# Patient Record
Sex: MALE | Race: WHITE
[De-identification: names, ages, dates, MRNs, and addresses within clinical notes are randomized per-mention and may not be internally consistent; named-entity substitution may affect disease eponyms.]

---

## 2018-12-21 ENCOUNTER — HOSPITAL ENCOUNTER (EMERGENCY)
Dept: HOSPITAL 80 - FED | Age: 53
Discharge: HOME | End: 2018-12-21
Payer: COMMERCIAL

## 2018-12-21 VITALS — SYSTOLIC BLOOD PRESSURE: 126 MMHG | DIASTOLIC BLOOD PRESSURE: 79 MMHG

## 2018-12-21 DIAGNOSIS — J32.0: Primary | ICD-10-CM

## 2018-12-21 DIAGNOSIS — R19.7: ICD-10-CM

## 2018-12-21 LAB — PLATELET # BLD: 228 10^3/UL (ref 150–400)

## 2018-12-21 NOTE — EDPHY
H & P


Stated Complaint: GI problems~3wks;?due to antibx to tx sinus inf;[facility] 

[facility] ?cdiff


Time Seen by Provider: 12/21/18 15:57


HPI/ROS: 





HPI





CHIEF COMPLAINT:  Right-sided sinus disease.  Diarrhea.





HISTORY OF PRESENT ILLNESS:  53-year-old male, otherwise healthy presents 

emergency room stating that he has had some increasing right-sided sinus pain.  

Patient states in early November he had a dental procedure wrist surgery done 

to the right-sided upper jaw line with a bone graft.  He states this was 

somewhat complicated by a perforation of his right maxillary sinus.  He was 

placed on amoxicillin antibiotics for that.  This caused him to get diarrhea.  

He has had waxing waning diarrhea with abdominal bloating and gas over the past 

2 weeks.  He still is having diarrhea however reports that he did have a solid 

bowel movement today.  Denies bloody stool.  Denies significant abdominal pain, 

denies fever.  He additionally reports that he still has ongoing right-sided 

maxillary sinus pressure.  No headache.  No fever.  No neck pain.


The patient went to urgent care today was referred to the emergency room for 

further evaluation of his diarrhea given that he was on antibiotics, 

additionally was also referred for sinus evaluation.


The patient is currently not on any antibiotics.


Currently denies any abdominal pain.








Past Medical History:  No medical history





Past Surgical History:  Right upper jaw line surgery





Social History:  Denies drugs alcohol tobacco.





Family History:  Noncontributory








ROS   


REVIEW OF SYSTEMS:


10 Systems were reviewed and negative with the exception of the elements 

mentioned in the history of present illness.








Exam   


Constitutional  appears well nontoxic triage nursing summary reviewed, vital 

signs reviewed, awake/alert.  Vital signs stable.


Eyes   normal conjunctivae and sclera, EOMI, PERRLA. 


HENT TMs clear bilaterally, mild tender palpation over the right maxillary sinus

,  normal inspection, atraumatic, moist mucus membranes, no epistaxis, neck 

supple/ no meningismus, no raccoon eyes. 


Respiratory   clear to auscultation bilaterally, normal breath sounds, no 

respiratory distress, no wheezing. 


Cardiovascular   rate normal, regular rhythm, no murmur, no edema, distal 

pulses normal. 


Gastrointestinal nontender abdomen,   soft, non-tender, no rebound, no guarding

, normal bowel sounds, no distension, no pulsatile mass. 


Genitourinary   no CVA tenderness. 


Musculoskeletal  no midline vertebral tenderness, full range of motion, no calf 

swelling, no tenderness of extremities, no meningismus, good pulses, 

neurovascularly intact.


Skin   pink, warm, & dry, no rash, skin atraumatic. 


Neurologic   awake, alert and oriented x 3, AAOx3, moves all 4 extremities 

equally, motor intact, sensory intact, CN II-XII intact, normal cerebellar, 

normal vision, normal speech. 


Psychiatric   normal mood/affect. 


Heme/Lymph/Immune   no lymphadenopathy.





Differential Diagnosis:  Includes but is not limited to in a particular order 

acute sinusitis, acute on chronic sinusitis, C diff, antibiotic associated 

diarrhea, dehydration, electrolyte disturbance





Medical Decision Making:  Plan for this patient IV establishment with IV fluid 

bolus, blood draw, stool studies,





Re-evaluation:








Stool studies resulted Giardia.








Stool study positive for Giardia.  Spoke with Dr. Chappell with Infectious Disease 

recommends Tinidazole 2G po x 1.








I spoke with ENT Dr. Harrison he recommends Augmentin 875 mg times 14 days.  

Twice daily.


Additionally recommends Decadron 8 mg for 2 days in a row.





Additionally the patient has GERD on stool studies I have ordered Tinidazole 

one time dose in er. 








I discussed with this patient that he needs to take Augmentin for the next 2 

weeks.


He has a follow-up with Dr. Harrison.





Additionally consulted with id about Giardia treatment.  Recommends Tinidazole 

2G PO x 1 dose in Er.





This is been ordered.








Patient understands return emergency develops worsening abdominal pain, fever, 

vomiting bloody stools, not feeling well.





Antibiotics with food.








Source: Patient





- Personal History


Current Tetanus Diphtheria and Acellular Pertussis (TDAP): Yes





- Medical/Surgical History


Other PMH: healthy





- Social History


Smoking Status: Never smoked


Constitutional: 


 Initial Vital Signs











Temperature (C)  36.7 C   12/21/18 14:45


 


Heart Rate  82   12/21/18 14:45


 


Respiratory Rate  18   12/21/18 14:45


 


Blood Pressure  126/79 H  12/21/18 14:45


 


O2 Sat (%)  98   12/21/18 14:45








 











O2 Delivery Mode               Room Air














Allergies/Adverse Reactions: 


 





No Known Allergies Allergy (Unverified 12/21/18 14:48)


 








Home Medications: 














 Medication  Instructions  Recorded


 


Amoxicillin/Clavulanate Pot 875 mg PO BID #28 tab 12/21/18





[Augmentin 875 MG TAB (*)]  


 


Dexamethasone [Decadron 4 MG (*)] 8 mg PO DAILY #4 tab 12/21/18














Medical Decision Making





- Diagnostics


Imaging Results: 


 Imaging Impressions





Face CT  12/21/18 15:56


Impression: 


Findings compatible with right maxillary sinusitis with cortical breakthrough 

inferiorly adjacent to hyperdense material compatible with patient's reported 

history of dental procedure transgressing the maxillary sinus requiring bone 

graft material. Underlying osteomyelitis cannot be radiographically excluded. 

If this is a clinical concern, further evaluation with MRI is recommended.


 


Rodrigue Aldana was notified of these findings by telephone at 4:35 PM on 12/21/ 2018.














- Data Points


Laboratory Results: 


 Laboratory Results





 12/21/18 16:24 





 12/21/18 16:24 





 











  12/21/18 12/21/18





  16:24 16:24


 


WBC    8.03 10^3/uL 10^3/uL





    (3.80-9.50) 


 


RBC    5.04 10^6/uL 10^6/uL





    (4.40-6.38) 


 


Hgb    15.5 g/dL g/dL





    (13.7-17.5) 


 


Hct    45.9 % %





    (40.0-51.0) 


 


MCV    91.1 fL fL





    (81.5-99.8) 


 


MCH    30.8 pg pg





    (27.9-34.1) 


 


MCHC    33.8 g/dL g/dL





    (32.4-36.7) 


 


RDW    12.5 % %





    (11.5-15.2) 


 


Plt Count    228 10^3/uL 10^3/uL





    (150-400) 


 


MPV    9.6 fL fL





    (8.7-11.7) 


 


Neut % (Auto)    67.7 % %





    (39.3-74.2) 


 


Lymph % (Auto)    21.8 % %





    (15.0-45.0) 


 


Mono % (Auto)    9.2 % %





    (4.5-13.0) 


 


Eos % (Auto)    0.5 % L %





    (0.6-7.6) 


 


Baso % (Auto)    0.4 % %





    (0.3-1.7) 


 


Nucleat RBC Rel Count    0.0 % %





    (0.0-0.2) 


 


Absolute Neuts (auto)    5.44 10^3/uL 10^3/uL





    (1.70-6.50) 


 


Absolute Lymphs (auto)    1.75 10^3/uL 10^3/uL





    (1.00-3.00) 


 


Absolute Monos (auto)    0.74 10^3/uL 10^3/uL





    (0.30-0.80) 


 


Absolute Eos (auto)    0.04 10^3/uL 10^3/uL





    (0.03-0.40) 


 


Absolute Basos (auto)    0.03 10^3/uL 10^3/uL





    (0.02-0.10) 


 


Absolute Nucleated RBC    0.00 10^3/uL 10^3/uL





    (0-0.01) 


 


Immature Gran %    0.4 % %





    (0.0-1.1) 


 


Immature Gran #    0.03 10^3/uL 10^3/uL





    (0.00-0.10) 


 


Sodium  133 mEq/L L mEq/L  





   (135-145)  


 


Potassium  4.0 mEq/L mEq/L  





   (3.5-5.2)  


 


Chloride  102 mEq/L mEq/L  





   ()  


 


Carbon Dioxide  25 mEq/l mEq/l  





   (22-31)  


 


Anion Gap  6 mEq/L mEq/L  





   (6-14)  


 


BUN  12 mg/dL mg/dL  





   (7-23)  


 


Creatinine  0.9 mg/dL mg/dL  





   (0.7-1.3)  


 


Estimated GFR  > 60   





   


 


Glucose  83 mg/dL mg/dL  





   ()  


 


Calcium  8.6 mg/dL mg/dL  





   (8.5-10.4)  


 


Total Bilirubin  0.5 mg/dL mg/dL  





   (0.1-1.4)  


 


Conjugated Bilirubin  0.2 mg/dL mg/dL  





   (0.0-0.5)  


 


Unconjugated Bilirubin  0.3 mg/dL mg/dL  





   (0.0-1.1)  


 


AST  37 IU/L IU/L  





   (17-59)  


 


ALT  46 IU/L IU/L  





   (21-72)  


 


Alkaline Phosphatase  111 IU/L IU/L  





   ()  


 


Total Protein  7.0 g/dL g/dL  





   (6.3-8.2)  


 


Albumin  3.9 g/dL g/dL  





   (3.5-5.0)  


 


Lipase  73 IU/L IU/L  





   ()  











Microbiology Results: 


 MICROBIOLOGY





12/21/18 16:17   Stool   Gastrointestinal Tract Panel (PCR) - Final


                            Giardia Lamblia





Medications Given: 


 








Discontinued Medications





Sodium Chloride (Ns)  1,000 mls @ 0 mls/hr IV EDNOW ONE; Wide Open


   PRN Reason: Protocol


   Stop: 12/21/18 15:57


   Last Admin: 12/21/18 16:26 Dose:  1,000 mls








Departure





- Departure


Disposition: Home, Routine, Self-Care


Clinical Impression: 


 Sinusitis, Diarrhea





Condition: Good


Instructions:  Sinusitis (ED), Acute Diarrhea (ED)


Additional Instructions: 


1. Follow up with ENT.  Call for follow-up appointment.


2. Return to the emergency room if worsening symptoms


3. Return emergency room if develops worsening abdominal pain, fever, vomiting.


4. You have Giardia.


5. You need to take her antibiotics for her sinus infection as prescribed.


6. Antibiotics with food not on an empty stomach.





Referrals: 


NONE *PRIMARY CARE P,. [Primary Care Provider] - As per Instructions


Demetris Harrison MD [Medical Doctor] - As per Instructions


Prescriptions: 


Amoxicillin/Clavulanate Pot [Augmentin 875 MG TAB (*)] 875 mg PO BID #28 tab


Dexamethasone [Decadron 4 MG (*)] 8 mg PO DAILY #4 tab

## 2022-09-01 ENCOUNTER — APPOINTMENT (RX ONLY)
Dept: URBAN - METROPOLITAN AREA CLINIC 136 | Facility: CLINIC | Age: 57
Setting detail: DERMATOLOGY
End: 2022-09-01

## 2022-09-01 VITALS — HEIGHT: 70 IN | WEIGHT: 205 LBS

## 2022-09-01 DIAGNOSIS — L738 OTHER SPECIFIED DISEASES OF HAIR AND HAIR FOLLICLES: ICD-10-CM

## 2022-09-01 DIAGNOSIS — D18.0 HEMANGIOMA: ICD-10-CM

## 2022-09-01 DIAGNOSIS — D22 MELANOCYTIC NEVI: ICD-10-CM

## 2022-09-01 DIAGNOSIS — L82.1 OTHER SEBORRHEIC KERATOSIS: ICD-10-CM

## 2022-09-01 DIAGNOSIS — L57.8 OTHER SKIN CHANGES DUE TO CHRONIC EXPOSURE TO NONIONIZING RADIATION: ICD-10-CM

## 2022-09-01 DIAGNOSIS — L663 OTHER SPECIFIED DISEASES OF HAIR AND HAIR FOLLICLES: ICD-10-CM

## 2022-09-01 DIAGNOSIS — L73.9 FOLLICULAR DISORDER, UNSPECIFIED: ICD-10-CM

## 2022-09-01 DIAGNOSIS — L81.4 OTHER MELANIN HYPERPIGMENTATION: ICD-10-CM

## 2022-09-01 PROBLEM — L02.821 FURUNCLE OF HEAD [ANY PART, EXCEPT FACE]: Status: ACTIVE | Noted: 2022-09-01

## 2022-09-01 PROBLEM — D18.01 HEMANGIOMA OF SKIN AND SUBCUTANEOUS TISSUE: Status: ACTIVE | Noted: 2022-09-01

## 2022-09-01 PROBLEM — D22.4 MELANOCYTIC NEVI OF SCALP AND NECK: Status: ACTIVE | Noted: 2022-09-01

## 2022-09-01 PROCEDURE — 99203 OFFICE O/P NEW LOW 30 MIN: CPT

## 2022-09-01 PROCEDURE — ? COUNSELING

## 2022-09-01 ASSESSMENT — LOCATION SIMPLE DESCRIPTION DERM
LOCATION SIMPLE: LEFT FOOT
LOCATION SIMPLE: CHEST
LOCATION SIMPLE: RIGHT ANTERIOR NECK
LOCATION SIMPLE: RIGHT UPPER BACK
LOCATION SIMPLE: RIGHT FOOT
LOCATION SIMPLE: SUPERIOR FOREHEAD
LOCATION SIMPLE: UPPER BACK
LOCATION SIMPLE: SCALP

## 2022-09-01 ASSESSMENT — LOCATION DETAILED DESCRIPTION DERM
LOCATION DETAILED: RIGHT SUPERIOR MEDIAL UPPER BACK
LOCATION DETAILED: LEFT DORSAL FOOT
LOCATION DETAILED: RIGHT DORSAL FOOT
LOCATION DETAILED: RIGHT CENTRAL FRONTAL SCALP
LOCATION DETAILED: STERNUM
LOCATION DETAILED: SUPERIOR MID FOREHEAD
LOCATION DETAILED: RIGHT CLAVICULAR NECK
LOCATION DETAILED: SUPERIOR THORACIC SPINE

## 2022-09-01 ASSESSMENT — LOCATION ZONE DERM
LOCATION ZONE: NECK
LOCATION ZONE: FEET
LOCATION ZONE: TRUNK
LOCATION ZONE: SCALP
LOCATION ZONE: FACE

## 2022-09-01 NOTE — HPI: FULL BODY SKIN EXAMINATION
What Type Of Note Output Would You Prefer (Optional)?: Standard Output
What Is The Reason For Today's Visit?: Full Body Skin Examination
What Is The Reason For Today's Visit? (Being Monitored For X): surveillance against the recurrence of atypical nevi
How Severe Are Your Spot(S)?: mild
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

## 2023-08-03 ENCOUNTER — APPOINTMENT (RX ONLY)
Dept: URBAN - METROPOLITAN AREA CLINIC 138 | Facility: CLINIC | Age: 58
Setting detail: DERMATOLOGY
End: 2023-08-03

## 2023-08-03 VITALS — HEIGHT: 70 IN | WEIGHT: 205 LBS

## 2023-08-03 DIAGNOSIS — L82.1 OTHER SEBORRHEIC KERATOSIS: ICD-10-CM

## 2023-08-03 DIAGNOSIS — D22 MELANOCYTIC NEVI: ICD-10-CM

## 2023-08-03 DIAGNOSIS — L57.8 OTHER SKIN CHANGES DUE TO CHRONIC EXPOSURE TO NONIONIZING RADIATION: ICD-10-CM

## 2023-08-03 DIAGNOSIS — L82.0 INFLAMED SEBORRHEIC KERATOSIS: ICD-10-CM

## 2023-08-03 DIAGNOSIS — L81.4 OTHER MELANIN HYPERPIGMENTATION: ICD-10-CM

## 2023-08-03 DIAGNOSIS — Z71.89 OTHER SPECIFIED COUNSELING: ICD-10-CM

## 2023-08-03 DIAGNOSIS — D18.0 HEMANGIOMA: ICD-10-CM

## 2023-08-03 PROBLEM — D22.4 MELANOCYTIC NEVI OF SCALP AND NECK: Status: ACTIVE | Noted: 2023-08-03

## 2023-08-03 PROBLEM — D18.01 HEMANGIOMA OF SKIN AND SUBCUTANEOUS TISSUE: Status: ACTIVE | Noted: 2023-08-03

## 2023-08-03 PROCEDURE — ? COUNSELING

## 2023-08-03 PROCEDURE — 99213 OFFICE O/P EST LOW 20 MIN: CPT | Mod: 25

## 2023-08-03 PROCEDURE — 17110 DESTRUCTION B9 LES UP TO 14: CPT

## 2023-08-03 PROCEDURE — ? LIQUID NITROGEN

## 2023-08-03 ASSESSMENT — LOCATION DETAILED DESCRIPTION DERM
LOCATION DETAILED: SUPERIOR THORACIC SPINE
LOCATION DETAILED: LEFT DISTAL PRETIBIAL REGION
LOCATION DETAILED: SUPERIOR MID FOREHEAD
LOCATION DETAILED: RIGHT CLAVICULAR NECK
LOCATION DETAILED: RIGHT SUPERIOR MEDIAL UPPER BACK
LOCATION DETAILED: RIGHT POSTERIOR EAR
LOCATION DETAILED: STERNUM
LOCATION DETAILED: RIGHT DISTAL PRETIBIAL REGION

## 2023-08-03 ASSESSMENT — LOCATION ZONE DERM
LOCATION ZONE: NECK
LOCATION ZONE: EAR
LOCATION ZONE: LEG
LOCATION ZONE: TRUNK
LOCATION ZONE: FACE

## 2023-08-03 ASSESSMENT — LOCATION SIMPLE DESCRIPTION DERM
LOCATION SIMPLE: RIGHT PRETIBIAL REGION
LOCATION SIMPLE: RIGHT UPPER BACK
LOCATION SIMPLE: UPPER BACK
LOCATION SIMPLE: LEFT PRETIBIAL REGION
LOCATION SIMPLE: SUPERIOR FOREHEAD
LOCATION SIMPLE: RIGHT ANTERIOR NECK
LOCATION SIMPLE: CHEST
LOCATION SIMPLE: RIGHT EAR

## 2023-08-03 NOTE — PROCEDURE: LIQUID NITROGEN
Number Of Freeze-Thaw Cycles: 2 freeze-thaw cycles
Medical Necessity Clause: This procedure was medically necessary because the lesions that were treated were:
Spray Paint Technique: No
Show Spray Paint Technique Variable?: Yes
Detail Level: Detailed
Post-Care Instructions: I reviewed with the patient in detail post-care instructions. Patient is to wear sunprotection, and avoid picking at any of the treated lesions. Pt may apply Vaseline to crusted or scabbing areas.
Duration Of Freeze Thaw-Cycle (Seconds): 5-10
Spray Paint Text: The liquid nitrogen was applied to the skin utilizing a spray paint frosting technique.
Medical Necessity Information: It is in your best interest to select a reason for this procedure from the list below. All of these items fulfill various CMS LCD requirements except the new and changing color options.
Consent: The patient's consent was obtained including but not limited to risks of crusting, scabbing, blistering, scarring, darker or lighter pigmentary change, recurrence, incomplete removal and infection.

## 2024-08-05 ENCOUNTER — APPOINTMENT (RX ONLY)
Dept: URBAN - METROPOLITAN AREA CLINIC 138 | Facility: CLINIC | Age: 59
Setting detail: DERMATOLOGY
End: 2024-08-05

## 2024-08-05 VITALS — HEIGHT: 70 IN | WEIGHT: 215 LBS

## 2024-08-05 DIAGNOSIS — Z71.89 OTHER SPECIFIED COUNSELING: ICD-10-CM

## 2024-08-05 DIAGNOSIS — L82.1 OTHER SEBORRHEIC KERATOSIS: ICD-10-CM

## 2024-08-05 DIAGNOSIS — D49.2 NEOPLASM OF UNSPECIFIED BEHAVIOR OF BONE, SOFT TISSUE, AND SKIN: ICD-10-CM

## 2024-08-05 DIAGNOSIS — L81.4 OTHER MELANIN HYPERPIGMENTATION: ICD-10-CM

## 2024-08-05 DIAGNOSIS — D18.0 HEMANGIOMA: ICD-10-CM

## 2024-08-05 DIAGNOSIS — D22 MELANOCYTIC NEVI: ICD-10-CM

## 2024-08-05 DIAGNOSIS — L57.8 OTHER SKIN CHANGES DUE TO CHRONIC EXPOSURE TO NONIONIZING RADIATION: ICD-10-CM

## 2024-08-05 DIAGNOSIS — A63.0 ANOGENITAL (VENEREAL) WARTS: ICD-10-CM

## 2024-08-05 DIAGNOSIS — L82.0 INFLAMED SEBORRHEIC KERATOSIS: ICD-10-CM

## 2024-08-05 PROBLEM — D22.4 MELANOCYTIC NEVI OF SCALP AND NECK: Status: ACTIVE | Noted: 2024-08-05

## 2024-08-05 PROBLEM — D18.01 HEMANGIOMA OF SKIN AND SUBCUTANEOUS TISSUE: Status: ACTIVE | Noted: 2024-08-05

## 2024-08-05 PROCEDURE — ? BIOPSY BY SHAVE METHOD

## 2024-08-05 PROCEDURE — 99213 OFFICE O/P EST LOW 20 MIN: CPT | Mod: 25

## 2024-08-05 PROCEDURE — 17110 DESTRUCTION B9 LES UP TO 14: CPT

## 2024-08-05 PROCEDURE — 11102 TANGNTL BX SKIN SINGLE LES: CPT | Mod: 59

## 2024-08-05 PROCEDURE — ? COUNSELING

## 2024-08-05 PROCEDURE — ? LIQUID NITROGEN

## 2024-08-05 ASSESSMENT — LOCATION DETAILED DESCRIPTION DERM
LOCATION DETAILED: SUPERIOR THORACIC SPINE
LOCATION DETAILED: SUPRAPUBIC SKIN
LOCATION DETAILED: LEFT ANTERIOR PROXIMAL THIGH
LOCATION DETAILED: RIGHT CLAVICULAR NECK
LOCATION DETAILED: STERNUM
LOCATION DETAILED: SUPERIOR MID FOREHEAD
LOCATION DETAILED: RIGHT SUPERIOR MEDIAL UPPER BACK
LOCATION DETAILED: LEFT NASAL SIDEWALL

## 2024-08-05 ASSESSMENT — LOCATION SIMPLE DESCRIPTION DERM
LOCATION SIMPLE: SUPERIOR FOREHEAD
LOCATION SIMPLE: RIGHT UPPER BACK
LOCATION SIMPLE: CHEST
LOCATION SIMPLE: UPPER BACK
LOCATION SIMPLE: LEFT THIGH
LOCATION SIMPLE: RIGHT ANTERIOR NECK
LOCATION SIMPLE: LEFT NOSE
LOCATION SIMPLE: GROIN

## 2024-08-05 ASSESSMENT — LOCATION ZONE DERM
LOCATION ZONE: NOSE
LOCATION ZONE: TRUNK
LOCATION ZONE: LEG
LOCATION ZONE: FACE
LOCATION ZONE: NECK

## 2024-08-05 NOTE — PROCEDURE: LIQUID NITROGEN
Show Spray Paint Technique Variable?: Yes
Medical Necessity Clause: This procedure was medically necessary because the lesions that were treated were:
Post-Care Instructions: I reviewed with the patient in detail post-care instructions. Patient is to wear sunprotection, and avoid picking at any of the treated lesions. Pt may apply Vaseline to crusted or scabbing areas.
Duration Of Freeze Thaw-Cycle (Seconds): 5-10
Detail Level: Detailed
Spray Paint Text: The liquid nitrogen was applied to the skin utilizing a spray paint frosting technique.
Add 52 Modifier (Optional): no
Consent: The patient's consent was obtained including but not limited to risks of crusting, scabbing, blistering, scarring, darker or lighter pigmentary change, recurrence, incomplete removal and infection.
Medical Necessity Information: It is in your best interest to select a reason for this procedure from the list below. All of these items fulfill various CMS LCD requirements except the new and changing color options.
Number Of Freeze-Thaw Cycles: 2 freeze-thaw cycles